# Patient Record
(demographics unavailable — no encounter records)

---

## 2025-07-17 NOTE — REVIEW OF SYSTEMS
[see HPI] : see HPI [Negative] : Heme/Lymph [Wake up at night to urinate  How many times?  ___] : wakes up to urinate [unfilled] times during the night [Strong urge to urinate] : strong urge to urinate [Bladder pressure] : experiences bladder pressure

## 2025-07-17 NOTE — PHYSICAL EXAM
[Normal Appearance] : normal appearance [Well Groomed] : well groomed [General Appearance - In No Acute Distress] : no acute distress [Edema] : no peripheral edema [Respiration, Rhythm And Depth] : normal respiratory rhythm and effort [Exaggerated Use Of Accessory Muscles For Inspiration] : no accessory muscle use [Abdomen Soft] : soft [Abdomen Tenderness] : non-tender [Normal Station and Gait] : the gait and station were normal for the patient's age [] : no rash [No Focal Deficits] : no focal deficits [Oriented To Time, Place, And Person] : oriented to person, place, and time [Affect] : the affect was normal [Mood] : the mood was normal [de-identified] : Urethral meatal stenosis.  Mild anterior vaginal wall prolapse. No urinary incontinence with Valsalva. No tenderness on intravaginal exam. No masses or lesions noted.  [MA] : MA [FreeTextEntry2] : Brigitte Kyle

## 2025-07-17 NOTE — ASSESSMENT
[FreeTextEntry1] : 44F with urethral meatal stenosis.  Endorses nocturia x 2-3, sensation of incomplete bladder emptying and suprapubic fullness.   UA dip showed trace blood PVR 0cc  - UA/Ucx today - Avoid fluids 2-3 hours before bedtime - Increase hydration - Triamcinolone cream to urethra - Discussed possible urethral dilation - Uroflow/PVR next visit - Will call with results  - RTC in 4 weeks

## 2025-07-17 NOTE — HISTORY OF PRESENT ILLNESS
[Urinary Frequency] : urinary frequency [Nocturia] : nocturia [FreeTextEntry1] : 44F with no significant PMHx presents today for evaluation for nocturia x 2-3 for the last 2 months.  Endorses suprapubic fullness, urinary frequency and lower back pain as well.  Went to her PCP in May where urine culture was negative.  Drinks 2 glasses of water per day and 1 cup of coffee.  Is most bothered by nocturia, as she finds it difficult to get back to sleep.  States she does not snore.  Does not push/strain to urinate or leak. Denies history of gross hematuria, kidney stones or UTIs.  No FMHX of , breast or colon CA.  Patient believes she is approaching perimenopause, still menstruates once per month. Has appointment with GYN next week for hormone panel. ,  x 1,  x 1.  Denies vaginal dryness, pelvic pain or dyspareunia.  No sensation of organ prolapse.